# Patient Record
Sex: FEMALE | Race: WHITE | NOT HISPANIC OR LATINO | URBAN - METROPOLITAN AREA
[De-identification: names, ages, dates, MRNs, and addresses within clinical notes are randomized per-mention and may not be internally consistent; named-entity substitution may affect disease eponyms.]

---

## 2023-05-07 ENCOUNTER — INPATIENT (INPATIENT)
Facility: HOSPITAL | Age: 23
LOS: 2 days | Discharge: HOME | End: 2023-05-10
Attending: OBSTETRICS & GYNECOLOGY | Admitting: OBSTETRICS & GYNECOLOGY
Payer: COMMERCIAL

## 2023-05-07 DIAGNOSIS — O26.899 OTHER SPECIFIED PREGNANCY RELATED CONDITIONS, UNSPECIFIED TRIMESTER: ICD-10-CM

## 2023-05-08 VITALS — HEART RATE: 106 BPM | SYSTOLIC BLOOD PRESSURE: 129 MMHG | DIASTOLIC BLOOD PRESSURE: 80 MMHG

## 2023-05-08 DIAGNOSIS — Z34.80 ENCOUNTER FOR SUPERVISION OF OTHER NORMAL PREGNANCY, UNSPECIFIED TRIMESTER: ICD-10-CM

## 2023-05-08 LAB
BASOPHILS # BLD AUTO: 0.05 K/UL — SIGNIFICANT CHANGE UP (ref 0–0.2)
BASOPHILS NFR BLD AUTO: 0.4 % — SIGNIFICANT CHANGE UP (ref 0–2)
BLD GP AB SCN SERPL QL: POSITIVE — SIGNIFICANT CHANGE UP
EOSINOPHIL # BLD AUTO: 0.11 K/UL — SIGNIFICANT CHANGE UP (ref 0–0.5)
EOSINOPHIL NFR BLD AUTO: 1 % — SIGNIFICANT CHANGE UP (ref 0–6)
HBV SURFACE AG SER-ACNC: SIGNIFICANT CHANGE UP
HBV SURFACE AG SERPL QL IA: SIGNIFICANT CHANGE UP
HCT VFR BLD CALC: 35.2 % — SIGNIFICANT CHANGE UP (ref 34.5–45)
HGB BLD-MCNC: 11.2 G/DL — LOW (ref 11.5–15.5)
HIV 1 & 2 AB SERPL IA.RAPID: SIGNIFICANT CHANGE UP
IMM GRANULOCYTES NFR BLD AUTO: 1.6 % — HIGH (ref 0–0.9)
LYMPHOCYTES # BLD AUTO: 18.4 % — SIGNIFICANT CHANGE UP (ref 13–44)
LYMPHOCYTES # BLD AUTO: 2.09 K/UL — SIGNIFICANT CHANGE UP (ref 1–3.3)
MCHC RBC-ENTMCNC: 26.4 PG — LOW (ref 27–34)
MCHC RBC-ENTMCNC: 31.8 GM/DL — LOW (ref 32–36)
MCV RBC AUTO: 82.8 FL — SIGNIFICANT CHANGE UP (ref 80–100)
MONOCYTES # BLD AUTO: 0.96 K/UL — HIGH (ref 0–0.9)
MONOCYTES NFR BLD AUTO: 8.5 % — SIGNIFICANT CHANGE UP (ref 2–14)
NEUTROPHILS # BLD AUTO: 7.94 K/UL — HIGH (ref 1.8–7.4)
NEUTROPHILS NFR BLD AUTO: 70.1 % — SIGNIFICANT CHANGE UP (ref 43–77)
NRBC # BLD: 0 /100 WBCS — SIGNIFICANT CHANGE UP (ref 0–0)
PLATELET # BLD AUTO: 184 K/UL — SIGNIFICANT CHANGE UP (ref 150–400)
RBC # BLD: 4.25 M/UL — SIGNIFICANT CHANGE UP (ref 3.8–5.2)
RBC # FLD: 15.3 % — HIGH (ref 10.3–14.5)
RH IG SCN BLD-IMP: NEGATIVE — SIGNIFICANT CHANGE UP
RUBV IGG SER-ACNC: 1.3 INDEX — SIGNIFICANT CHANGE UP
RUBV IGG SER-IMP: POSITIVE — SIGNIFICANT CHANGE UP
T PALLIDUM AB TITR SER: NEGATIVE — SIGNIFICANT CHANGE UP
WBC # BLD: 11.33 K/UL — HIGH (ref 3.8–10.5)
WBC # FLD AUTO: 11.33 K/UL — HIGH (ref 3.8–10.5)

## 2023-05-08 PROCEDURE — 86077 PHYS BLOOD BANK SERV XMATCH: CPT

## 2023-05-08 RX ORDER — SODIUM CHLORIDE 9 MG/ML
1000 INJECTION, SOLUTION INTRAVENOUS
Refills: 0 | Status: DISCONTINUED | OUTPATIENT
Start: 2023-05-08 | End: 2023-05-08

## 2023-05-08 RX ORDER — LANOLIN
1 OINTMENT (GRAM) TOPICAL EVERY 6 HOURS
Refills: 0 | Status: DISCONTINUED | OUTPATIENT
Start: 2023-05-08 | End: 2023-05-10

## 2023-05-08 RX ORDER — CEFAZOLIN SODIUM 1 G
500 VIAL (EA) INJECTION ONCE
Refills: 0 | Status: DISCONTINUED | OUTPATIENT
Start: 2023-05-08 | End: 2023-05-08

## 2023-05-08 RX ORDER — AMPICILLIN TRIHYDRATE 250 MG
1 CAPSULE ORAL EVERY 4 HOURS
Refills: 0 | Status: DISCONTINUED | OUTPATIENT
Start: 2023-05-08 | End: 2023-05-08

## 2023-05-08 RX ORDER — DIBUCAINE 1 %
1 OINTMENT (GRAM) RECTAL EVERY 6 HOURS
Refills: 0 | Status: DISCONTINUED | OUTPATIENT
Start: 2023-05-08 | End: 2023-05-10

## 2023-05-08 RX ORDER — OXYTOCIN 10 UNIT/ML
10 VIAL (ML) INJECTION ONCE
Refills: 0 | Status: COMPLETED | OUTPATIENT
Start: 2023-05-08 | End: 2023-05-08

## 2023-05-08 RX ORDER — SENNA PLUS 8.6 MG/1
2 TABLET ORAL AT BEDTIME
Refills: 0 | Status: DISCONTINUED | OUTPATIENT
Start: 2023-05-08 | End: 2023-05-10

## 2023-05-08 RX ORDER — AMPICILLIN TRIHYDRATE 250 MG
2 CAPSULE ORAL ONCE
Refills: 0 | Status: COMPLETED | OUTPATIENT
Start: 2023-05-08 | End: 2023-05-08

## 2023-05-08 RX ORDER — SODIUM CHLORIDE 9 MG/ML
3 INJECTION INTRAMUSCULAR; INTRAVENOUS; SUBCUTANEOUS EVERY 8 HOURS
Refills: 0 | Status: DISCONTINUED | OUTPATIENT
Start: 2023-05-08 | End: 2023-05-10

## 2023-05-08 RX ORDER — TETANUS TOXOID, REDUCED DIPHTHERIA TOXOID AND ACELLULAR PERTUSSIS VACCINE, ADSORBED 5; 2.5; 8; 8; 2.5 [IU]/.5ML; [IU]/.5ML; UG/.5ML; UG/.5ML; UG/.5ML
0.5 SUSPENSION INTRAMUSCULAR ONCE
Refills: 0 | Status: DISCONTINUED | OUTPATIENT
Start: 2023-05-08 | End: 2023-05-10

## 2023-05-08 RX ORDER — CEFAZOLIN SODIUM 1 G
500 VIAL (EA) INJECTION EVERY 8 HOURS
Refills: 0 | Status: DISCONTINUED | OUTPATIENT
Start: 2023-05-08 | End: 2023-05-08

## 2023-05-08 RX ORDER — DIPHENHYDRAMINE HCL 50 MG
25 CAPSULE ORAL EVERY 6 HOURS
Refills: 0 | Status: DISCONTINUED | OUTPATIENT
Start: 2023-05-08 | End: 2023-05-10

## 2023-05-08 RX ORDER — ACETAMINOPHEN 500 MG
975 TABLET ORAL
Refills: 0 | Status: DISCONTINUED | OUTPATIENT
Start: 2023-05-08 | End: 2023-05-10

## 2023-05-08 RX ORDER — CEFAZOLIN SODIUM 1 G
VIAL (EA) INJECTION
Refills: 0 | Status: DISCONTINUED | OUTPATIENT
Start: 2023-05-08 | End: 2023-05-08

## 2023-05-08 RX ORDER — PRAMOXINE HYDROCHLORIDE 150 MG/15G
1 AEROSOL, FOAM RECTAL EVERY 4 HOURS
Refills: 0 | Status: DISCONTINUED | OUTPATIENT
Start: 2023-05-08 | End: 2023-05-10

## 2023-05-08 RX ORDER — OXYCODONE HYDROCHLORIDE 5 MG/1
5 TABLET ORAL ONCE
Refills: 0 | Status: DISCONTINUED | OUTPATIENT
Start: 2023-05-08 | End: 2023-05-10

## 2023-05-08 RX ORDER — OXYCODONE HYDROCHLORIDE 5 MG/1
5 TABLET ORAL
Refills: 0 | Status: DISCONTINUED | OUTPATIENT
Start: 2023-05-08 | End: 2023-05-10

## 2023-05-08 RX ORDER — OXYTOCIN 10 UNIT/ML
333.33 VIAL (ML) INJECTION
Qty: 20 | Refills: 0 | Status: DISCONTINUED | OUTPATIENT
Start: 2023-05-08 | End: 2023-05-08

## 2023-05-08 RX ORDER — CITRIC ACID/SODIUM CITRATE 300-500 MG
15 SOLUTION, ORAL ORAL EVERY 6 HOURS
Refills: 0 | Status: DISCONTINUED | OUTPATIENT
Start: 2023-05-08 | End: 2023-05-08

## 2023-05-08 RX ORDER — CEFAZOLIN SODIUM 1 G
2000 VIAL (EA) INJECTION ONCE
Refills: 0 | Status: COMPLETED | OUTPATIENT
Start: 2023-05-08 | End: 2023-05-08

## 2023-05-08 RX ORDER — ONDANSETRON 8 MG/1
4 TABLET, FILM COATED ORAL ONCE
Refills: 0 | Status: COMPLETED | OUTPATIENT
Start: 2023-05-08 | End: 2023-05-08

## 2023-05-08 RX ORDER — POLYETHYLENE GLYCOL 3350 17 G/17G
17 POWDER, FOR SOLUTION ORAL DAILY
Refills: 0 | Status: DISCONTINUED | OUTPATIENT
Start: 2023-05-08 | End: 2023-05-10

## 2023-05-08 RX ORDER — KETOROLAC TROMETHAMINE 30 MG/ML
30 SYRINGE (ML) INJECTION ONCE
Refills: 0 | Status: DISCONTINUED | OUTPATIENT
Start: 2023-05-08 | End: 2023-05-08

## 2023-05-08 RX ORDER — SIMETHICONE 80 MG/1
80 TABLET, CHEWABLE ORAL EVERY 4 HOURS
Refills: 0 | Status: DISCONTINUED | OUTPATIENT
Start: 2023-05-08 | End: 2023-05-10

## 2023-05-08 RX ORDER — CHLORHEXIDINE GLUCONATE 213 G/1000ML
1 SOLUTION TOPICAL ONCE
Refills: 0 | Status: DISCONTINUED | OUTPATIENT
Start: 2023-05-08 | End: 2023-05-08

## 2023-05-08 RX ORDER — BENZOCAINE 10 %
1 GEL (GRAM) MUCOUS MEMBRANE EVERY 6 HOURS
Refills: 0 | Status: DISCONTINUED | OUTPATIENT
Start: 2023-05-08 | End: 2023-05-10

## 2023-05-08 RX ORDER — CEFAZOLIN SODIUM 1 G
2000 VIAL (EA) INJECTION EVERY 8 HOURS
Refills: 0 | Status: COMPLETED | OUTPATIENT
Start: 2023-05-08 | End: 2023-05-09

## 2023-05-08 RX ORDER — IBUPROFEN 200 MG
600 TABLET ORAL EVERY 6 HOURS
Refills: 0 | Status: COMPLETED | OUTPATIENT
Start: 2023-05-08 | End: 2024-04-05

## 2023-05-08 RX ORDER — CEFAZOLIN SODIUM 1 G
VIAL (EA) INJECTION
Refills: 0 | Status: DISCONTINUED | OUTPATIENT
Start: 2023-05-08 | End: 2023-05-10

## 2023-05-08 RX ORDER — HYDROCORTISONE 1 %
1 OINTMENT (GRAM) TOPICAL EVERY 6 HOURS
Refills: 0 | Status: DISCONTINUED | OUTPATIENT
Start: 2023-05-08 | End: 2023-05-10

## 2023-05-08 RX ORDER — IBUPROFEN 200 MG
600 TABLET ORAL EVERY 6 HOURS
Refills: 0 | Status: DISCONTINUED | OUTPATIENT
Start: 2023-05-08 | End: 2023-05-10

## 2023-05-08 RX ORDER — AER TRAVELER 0.5 G/1
1 SOLUTION RECTAL; TOPICAL EVERY 4 HOURS
Refills: 0 | Status: DISCONTINUED | OUTPATIENT
Start: 2023-05-08 | End: 2023-05-10

## 2023-05-08 RX ORDER — OXYTOCIN 10 UNIT/ML
41.67 VIAL (ML) INJECTION
Qty: 20 | Refills: 0 | Status: DISCONTINUED | OUTPATIENT
Start: 2023-05-08 | End: 2023-05-10

## 2023-05-08 RX ORDER — MAGNESIUM HYDROXIDE 400 MG/1
30 TABLET, CHEWABLE ORAL
Refills: 0 | Status: DISCONTINUED | OUTPATIENT
Start: 2023-05-08 | End: 2023-05-10

## 2023-05-08 RX ADMIN — SIMETHICONE 80 MILLIGRAM(S): 80 TABLET, CHEWABLE ORAL at 23:00

## 2023-05-08 RX ADMIN — Medication 200 GRAM(S): at 01:17

## 2023-05-08 RX ADMIN — Medication 100 MILLIGRAM(S): at 23:01

## 2023-05-08 RX ADMIN — Medication 600 MILLIGRAM(S): at 21:33

## 2023-05-08 RX ADMIN — SODIUM CHLORIDE 125 MILLILITER(S): 9 INJECTION, SOLUTION INTRAVENOUS at 01:19

## 2023-05-08 RX ADMIN — Medication 108 GRAM(S): at 09:30

## 2023-05-08 RX ADMIN — Medication 975 MILLIGRAM(S): at 19:01

## 2023-05-08 RX ADMIN — OXYCODONE HYDROCHLORIDE 5 MILLIGRAM(S): 5 TABLET ORAL at 23:30

## 2023-05-08 RX ADMIN — Medication 30 MILLIGRAM(S): at 15:20

## 2023-05-08 RX ADMIN — OXYCODONE HYDROCHLORIDE 5 MILLIGRAM(S): 5 TABLET ORAL at 23:00

## 2023-05-08 RX ADMIN — Medication 108 GRAM(S): at 13:30

## 2023-05-08 RX ADMIN — Medication 10 UNIT(S): at 14:25

## 2023-05-08 RX ADMIN — Medication 108 GRAM(S): at 05:19

## 2023-05-08 RX ADMIN — Medication 600 MILLIGRAM(S): at 22:03

## 2023-05-08 RX ADMIN — Medication 0.2 MILLIGRAM(S): at 14:30

## 2023-05-08 RX ADMIN — ONDANSETRON 4 MILLIGRAM(S): 8 TABLET, FILM COATED ORAL at 02:20

## 2023-05-08 RX ADMIN — Medication 0.25 MILLIGRAM(S): at 07:26

## 2023-05-08 RX ADMIN — Medication 100 MILLIGRAM(S): at 15:09

## 2023-05-08 NOTE — OB PROVIDER LABOR PROGRESS NOTE - NS_SUBJECTIVE/OBJECTIVE_OBGYN_ALL_OB_FT
Pt comfortable s/p epidural placement     Vital Signs Last 24 Hrs  T(C): 36.8 (08 May 2023 03:08), Max: 36.9 (08 May 2023 00:14)  T(F): 98.24 (08 May 2023 03:08), Max: 98.42 (08 May 2023 00:14)  HR: 99 (08 May 2023 04:50) (74 - 132)  BP: 109/53 (08 May 2023 04:41) (85/54 - 129/80)  RR: 18 (08 May 2023 03:08) (18 - 18)  SpO2: 99% (08 May 2023 04:50) (86% - 100%)    SVE 4/80/-2
OB PA Progress Note    Pt seen and evaluated for increased rectal pressure.    Exam  VSS  SVE 9/100/0  EFM cat I  [place] Q2-5min

## 2023-05-08 NOTE — OB RN PATIENT PROFILE - NS_PRENATALLABSOURCEGBS36_OBGYN_ALL_OB
Monitor FS, sliding scale coverage Ortho recalled as pt damaged his [place] brace while agitated. Ortho ordered new brace from orthotist. hard copy, drawn during this pregnancy

## 2023-05-08 NOTE — OB RN DELIVERY SUMMARY - NSSELHIDDEN_OBGYN_ALL_OB_FT
[NS_DeliveryAttending1_OBGYN_ALL_OB_FT:[email or web address]=],[NS_DeliveryRN_OBGYN_ALL_OB_FT:[id number]==]

## 2023-05-08 NOTE — OB RN DELIVERY SUMMARY - NS_DELBLDTRANSFUS_OBGYN_ALL_OB
Physical therapy:    Attempted PT session. Pt currently receiving at blood transfusion. Will defer and continue to follow. Thank you.     Meagan Rogers, PT, DPT No

## 2023-05-08 NOTE — OB PROVIDER DELIVERY SUMMARY - NSSELHIDDEN_OBGYN_ALL_OB_FT
[NS_DeliveryAttending1_OBGYN_ALL_OB_FT:[email or web address]=],[NS_DeliveryRN_OBGYN_ALL_OB_FT:[id number]==]

## 2023-05-08 NOTE — OB PROVIDER DELIVERY SUMMARY - NSPROVIDERDELIVERYNOTE_OBGYN_ALL_OB_FT
VAVd, pt delivered of an viable male infant apgar 9/9 weight 8-8, vacuum done from OA+2 over two contractions secondary to NRFHT, MLE, with fourth degree lacerATION.  CC.

## 2023-05-08 NOTE — OB RN PATIENT PROFILE - AS SC BRADEN SENSORY
Patient was released from hospital last Friday 9/4/2020 (Covid), states she needs a Hospital f/u 1 week, please advise [phone].  
Spoke with PT, appointment given   
(4) no impairment

## 2023-05-08 NOTE — OB PROVIDER LABOR PROGRESS NOTE - ASSESSMENT
P0 admitted for PROM, on oral cytotec, s/p epidural placement. SVE 4/80/-2, Cat I tracing.   - cont present management  - cont cytotec  - cEFM/toco    d/w Dr. Salas

## 2023-05-08 NOTE — OB PROVIDER H&P - NSHPPHYSICALEXAM_GEN_ALL_CORE
Vital Signs Last 24 Hrs  T(C): 36.9 (08 May 2023 00:17), Max: 36.9 (08 May 2023 00:17)  T(F): 98.4 (08 May 2023 00:17), Max: 98.4 (08 May 2023 00:17)  HR: 87 (08 May 2023 01:09) (83 - 111)  BP: 129/80 (08 May 2023 00:17) (129/80 - 129/80)  RR: 18 (08 May 2023 00:17) (18 - 18)  SpO2: 92% (08 May 2023 01:09) (92% - 100%)    Gen: alert, NAD  Abd: gravid, soft, non-tender  Ext: wwp, no LE edema or pain bilaterally    SSE: +pooling, +nitrazine, green tinged fluid  SVE: 3/70/-3    EFM: baseline 130, mod variability, +accels, no decels  [place]: irregular occ contractions  BSUS: vertex  EFW: 3400g

## 2023-05-08 NOTE — OB PROVIDER H&P - HISTORY OF PRESENT ILLNESS
OB Admission H&P    22yo  @ 38w1d LMP 22, CARLOS 22 presents for rupture of membranes at 11p, clear-green fluid with continued leakage. Also c/o worsening painful contractions q5min. Denies VB. +FM.      PNC: Dr. Salas  AP Issues: none  PNL: GBS positive, Rh negative    OBHx:   GynHx: h/o ovarian cysts. Denies abnormal Paps, STIs, fibroids   PMH: celiac disease  PSH: denies  Meds: PNV, Iron   Allergies: NKDA  Social: denies alcohol/tobacco/drug use in pregnancy   Psych: denies anxiety/depression

## 2023-05-08 NOTE — OB PROVIDER H&P - ASSESSMENT
24yo  @ 38w1d CARLOS 23 presenting with c/o ROM, found to be ruptured, SVE 3/-3, not marilee regularly on toco. Fetal status reassuring with Cat I tracing. GBS positive. Maternal vitals stable. Will admit for management of PROM.     - Admit to L&D  - Admission labs: CBC, RPR, T&S  -Clears  -cEFM/[place]  -ampicillin for GBS prophylaxis   -for cytotec induction  - Anesthesia consult prn for epidural placement     Discussed with Dr. Salas

## 2023-05-08 NOTE — OB RN PATIENT PROFILE - WEIGHT: TOTAL WEIGHT IN LBS
Client ambulated one time this NOC shift with no difficulty, denies pain, took one percocet for abd pain. Patient states effective in pain control. Taking fluids well, voiding well. abd sounds active x 4 quadrants and passing flatus. Report given to AM shift.    52

## 2023-05-08 NOTE — OB PROVIDER H&P - NSLOWPPHRISK_OBGYN_A_OB
No previous uterine incision/[place] Pregnancy/No known bleeding disorder/No other PPH risks indicated

## 2023-05-08 NOTE — OB NEONATOLOGY/PEDIATRICIAN DELIVERY SUMMARY - NSPEDSNEONOTESA_OBGYN_ALL_OB_FT
Requested by Dr. Salas to attend this VAVD born to a 23 y.o.  O-/HIV-/HepBsAg-/RI/RPR NR/GBS+ (s/p ampicillin x 4 Requested by Dr. Salas to attend this VAVD born to a 23 y.o.  O-/HIV-/HepBsAg-/RI/RPR NR/GBS+ (s/p ampicillin x 4) at 38 1/7 wks GA. PMH: celiac disease; allergy to tree nuts. Past ObGyn: ovarian cyst. SROM at 2250 with meconium stained AF.  Vacuum assisted.  Baby emerged cephalic and vigorous.  delayed cord clamping x 30 seconds. Baby brought to warmer, warmed, dried, sx'd and stimulated. HR > 100 bpm w/ good tone and respiratory effort.  Basic resuscitation provided.  EOS score = 0.1. Mother wants to breastfeed.

## 2023-05-09 DIAGNOSIS — Z37.9 OUTCOME OF DELIVERY, UNSPECIFIED: ICD-10-CM

## 2023-05-09 LAB — KLEIHAUER-BETKE CALCULATION: 0 % — SIGNIFICANT CHANGE UP (ref 0–0.3)

## 2023-05-09 RX ADMIN — SENNA PLUS 2 TABLET(S): 8.6 TABLET ORAL at 20:24

## 2023-05-09 RX ADMIN — POLYETHYLENE GLYCOL 3350 17 GRAM(S): 17 POWDER, FOR SOLUTION ORAL at 12:32

## 2023-05-09 RX ADMIN — Medication 600 MILLIGRAM(S): at 06:00

## 2023-05-09 RX ADMIN — Medication 600 MILLIGRAM(S): at 11:56

## 2023-05-09 RX ADMIN — Medication 975 MILLIGRAM(S): at 14:44

## 2023-05-09 RX ADMIN — Medication 100 MILLIGRAM(S): at 14:44

## 2023-05-09 RX ADMIN — SODIUM CHLORIDE 3 MILLILITER(S): 9 INJECTION INTRAMUSCULAR; INTRAVENOUS; SUBCUTANEOUS at 14:00

## 2023-05-09 RX ADMIN — Medication 975 MILLIGRAM(S): at 08:49

## 2023-05-09 RX ADMIN — OXYCODONE HYDROCHLORIDE 5 MILLIGRAM(S): 5 TABLET ORAL at 03:00

## 2023-05-09 RX ADMIN — OXYCODONE HYDROCHLORIDE 5 MILLIGRAM(S): 5 TABLET ORAL at 02:13

## 2023-05-09 RX ADMIN — Medication 1 TABLET(S): at 12:32

## 2023-05-09 RX ADMIN — SODIUM CHLORIDE 3 MILLILITER(S): 9 INJECTION INTRAMUSCULAR; INTRAVENOUS; SUBCUTANEOUS at 06:20

## 2023-05-09 RX ADMIN — Medication 975 MILLIGRAM(S): at 20:19

## 2023-05-09 RX ADMIN — Medication 600 MILLIGRAM(S): at 23:58

## 2023-05-09 RX ADMIN — Medication 975 MILLIGRAM(S): at 09:40

## 2023-05-09 RX ADMIN — Medication 975 MILLIGRAM(S): at 20:49

## 2023-05-09 RX ADMIN — Medication 975 MILLIGRAM(S): at 02:10

## 2023-05-09 RX ADMIN — Medication 600 MILLIGRAM(S): at 18:14

## 2023-05-09 RX ADMIN — Medication 975 MILLIGRAM(S): at 15:40

## 2023-05-09 RX ADMIN — Medication 975 MILLIGRAM(S): at 01:40

## 2023-05-09 RX ADMIN — Medication 100 MILLIGRAM(S): at 05:42

## 2023-05-09 RX ADMIN — Medication 600 MILLIGRAM(S): at 05:36

## 2023-05-09 RX ADMIN — Medication 600 MILLIGRAM(S): at 12:50

## 2023-05-09 NOTE — PROGRESS NOTE ADULT - SUBJECTIVE AND OBJECTIVE BOX
Postpartum Note- PPD#1    Allergies: No Known Allergies    Blood type: O negative  Rubella: Immune  RPR: Negative     S: Patient is a 23y  PPD#1 s/p VAVD with a 4th degree laceration.   Patient w/o complaints, pain is controlled.    Pt is OOB, tolerating PO, passing flatus. Lochia WNL.     O:  Vital Signs Last 24 Hrs  T(C): 36.6 (09 May 2023 06:00), Max: 37.1 (08 May 2023 15:26)  T(F): 97.9 (09 May 2023 06:00), Max: 98.8 (08 May 2023 15:26)  HR: 88 (09 May 2023 06:00) (71 - 119)  BP: 107/73 (09 May 2023 06:00) (96/57 - 145/60)  BP(mean): 96 (08 May 2023 17:26) (92 - 102)  RR: 18 (09 May 2023 06:00) (14 - 18)  SpO2: 97% (09 May 2023 06:00) (87% - 100%)    Parameters below as of 09 May 2023 06:00  Patient On (Oxygen Delivery Method): room air     Gen: NAD  Abdomen: Soft, nontender, non-distended, fundus firm  Lochia WNL  Ext: Neg edema, Neg calf tenderness    LABS:  Hemoglobin: 11.2 g/dL (23 @ 01:21)  Hematocrit: 35.2 % (23 @ 01:21)      A/P:  23y PPD#1 s/p VAVD, doing well.       PMHx: Denies  Current Issues: None    Increase OOB  Regular diet-low fiber  Continue with abx   PO Pain protocol  Continue routine post partum care    Eri Sloan PA-C

## 2023-05-09 NOTE — PROGRESS NOTE ADULT - PROBLEM SELECTOR PLAN 1
Increase OOB  Regular diet-low fiber  Continue with abx   PO Pain protocol  Continue routine post partum care

## 2023-05-10 VITALS
SYSTOLIC BLOOD PRESSURE: 99 MMHG | RESPIRATION RATE: 18 BRPM | TEMPERATURE: 98 F | DIASTOLIC BLOOD PRESSURE: 66 MMHG | OXYGEN SATURATION: 99 % | HEART RATE: 87 BPM

## 2023-05-10 PROCEDURE — 86703 HIV-1/HIV-2 1 RESULT ANTBDY: CPT

## 2023-05-10 PROCEDURE — 86870 RBC ANTIBODY IDENTIFICATION: CPT

## 2023-05-10 PROCEDURE — 59050 FETAL MONITOR W/REPORT: CPT

## 2023-05-10 PROCEDURE — 85025 COMPLETE CBC W/AUTO DIFF WBC: CPT

## 2023-05-10 PROCEDURE — 86850 RBC ANTIBODY SCREEN: CPT

## 2023-05-10 PROCEDURE — 87340 HEPATITIS B SURFACE AG IA: CPT

## 2023-05-10 PROCEDURE — 86901 BLOOD TYPING SEROLOGIC RH(D): CPT

## 2023-05-10 PROCEDURE — 86900 BLOOD TYPING SEROLOGIC ABO: CPT

## 2023-05-10 PROCEDURE — 85460 HEMOGLOBIN FETAL: CPT

## 2023-05-10 PROCEDURE — 86762 RUBELLA ANTIBODY: CPT

## 2023-05-10 PROCEDURE — 36415 COLL VENOUS BLD VENIPUNCTURE: CPT

## 2023-05-10 PROCEDURE — 86780 TREPONEMA PALLIDUM: CPT

## 2023-05-10 RX ORDER — ACETAMINOPHEN 500 MG
3 TABLET ORAL
Qty: 0 | Refills: 0 | DISCHARGE
Start: 2023-05-10

## 2023-05-10 RX ORDER — IBUPROFEN 200 MG
1 TABLET ORAL
Qty: 0 | Refills: 0 | DISCHARGE
Start: 2023-05-10

## 2023-05-10 RX ADMIN — Medication 600 MILLIGRAM(S): at 06:01

## 2023-05-10 RX ADMIN — Medication 975 MILLIGRAM(S): at 08:42

## 2023-05-10 RX ADMIN — POLYETHYLENE GLYCOL 3350 17 GRAM(S): 17 POWDER, FOR SOLUTION ORAL at 11:29

## 2023-05-10 RX ADMIN — Medication 600 MILLIGRAM(S): at 11:29

## 2023-05-10 RX ADMIN — Medication 600 MILLIGRAM(S): at 12:25

## 2023-05-10 RX ADMIN — Medication 600 MILLIGRAM(S): at 05:31

## 2023-05-10 RX ADMIN — Medication 975 MILLIGRAM(S): at 09:40

## 2023-05-10 RX ADMIN — Medication 1 TABLET(S): at 11:29

## 2023-05-10 RX ADMIN — Medication 600 MILLIGRAM(S): at 00:28

## 2023-05-10 NOTE — DISCHARGE NOTE OB - CARE PLAN
Principal Discharge DX:	 (normal spontaneous vaginal delivery)  Assessment and plan of treatment:	Return to baseline health, regular diet, pain control. Follow up with Dr. Salas.   1

## 2023-05-10 NOTE — DISCHARGE NOTE OB - PATIENT PORTAL LINK FT
You can access the FollowMyHealth Patient Portal offered by [facility] by registering at the following website: [email or web address]. By joining [facility]’s FollowMyHealth portal, you will also be able to view your health information using other applications (apps) compatible with our system.

## 2023-05-10 NOTE — DISCHARGE NOTE OB - MEDICATION SUMMARY - MEDICATIONS TO TAKE
I will START or STAY ON the medications listed below when I get home from the hospital:    ibuprofen 600 mg oral tablet  -- 1 tab(s) by mouth every 6 hours  -- Indication: For cramping    acetaminophen 325 mg oral tablet  -- 3 tab(s) by mouth every 6 hours as needed for  mild pain  -- Indication: For mild pain

## 2023-05-10 NOTE — DISCHARGE NOTE OB - MATERIALS PROVIDED
Vaccinations/[facility]  Screening Program/Breastfeeding Log/Bottle Feeding Log/Guide to Postpartum Care/[facility] Hearing Screen Program/Shaken Baby Prevention Handout/Breastfeeding Guide and Packet

## 2023-05-10 NOTE — DISCHARGE NOTE OB - NS MD DC FALL RISK RISK
For information on Fall & Injury Prevention, visit: [email or web address] OR  [email or web address] OR  https://www.cdc.gov/steadi/patient.html

## 2023-05-10 NOTE — DISCHARGE NOTE OB - CARE PROVIDER_API CALL
Vero Salas  OBSTETRICS AND GYNECOLOGY  [address]  [place] [address]  Phone: [phone]  Fax: [phone]  Follow Up Time:

## 2025-03-21 NOTE — OB PROVIDER H&P - BIRTH SEX
Called patient on clarification for need for Valtrex. Left message advising to return call with best day/time to be reached.  
Last Appointment:  3/21/2024  Future Appointments   Date Time Provider Department Center   3/27/2025  9:45 AM Harjinder Arrington MD [facility]        ER gave patient Valtex 1000 mg for eyes on 02/01/2024, she says shes taken it since age 4.    She would like it phoned in until she can be seen next Thursday.     Thanks!  
Female
